# Patient Record
(demographics unavailable — no encounter records)

---

## 2025-02-05 NOTE — ASSESSMENT
[FreeTextEntry1] : This is a 92-year-old woman here with 2 issues:  Mild cognitive impairment: She is currently stable.  I will continue donepezil 5 mg daily as she is tolerating it.  We did discuss stopping it for side effects or if it is no longer helping.  Neuropathy: She is currently stable without any neuropathic pain or irritation.  We will continue gabapentin 1200 mg at night.  I will see her back in the office for continued care in 6 months, sooner should the need arise.  Asked her daughter to call me in the interim with any problems, questions or concerns.
English

## 2025-02-05 NOTE — PHYSICAL EXAM
[Person] : oriented to person [Place] : oriented to place [Time] : disoriented to time [Remote Intact] : remote memory intact [Registration Intact] : recent registration memory intact [Span Intact] : the attention span was normal [Concentration Intact] : normal concentrating ability [Visual Intact] : visual attention was ~T not ~L decreased [Naming Objects] : no difficulty naming common objects [Repeating Phrases] : no difficulty repeating a phrase [Fluency] : fluency intact [Comprehension] : comprehension intact [Current Events] : adequate knowledge of current events [Past History] : adequate knowledge of personal past history [Cranial Nerves Optic (II)] : visual acuity intact bilaterally,  visual fields full to confrontation, pupils equal round and reactive to light [Cranial Nerves Oculomotor (III)] : extraocular motion intact [Cranial Nerves Trigeminal (V)] : facial sensation intact symmetrically [Cranial Nerves Facial (VII)] : face symmetrical [Cranial Nerves Vestibulocochlear (VIII)] : hearing was intact bilaterally [Cranial Nerves Glossopharyngeal (IX)] : tongue and palate midline [Cranial Nerves Accessory (XI - Cranial And Spinal)] : head turning and shoulder shrug symmetric [Cranial Nerves Hypoglossal (XII)] : there was no tongue deviation with protrusion [Motor Tone] : muscle tone was normal in all four extremities [Motor Strength] : muscle strength was normal in all four extremities [Involuntary Movements] : no involuntary movements were seen [No Muscle Atrophy] : normal bulk in all four extremities [Motor Handedness Right-Handed] : the patient is right hand dominant [Paresis Pronator Drift Right-Sided] : no pronator drift on the right [Paresis Pronator Drift Left-Sided] : no pronator drift on the left [Motor Strength Upper Extremities Bilaterally] : strength was normal in both upper extremities [Motor Strength Lower Extremities Bilaterally] : strength was normal in both lower extremities [Sensation Tactile Decrease] : light touch was intact [Sensation Pain / Temperature Decrease] : pain and temperature was intact [Proprioception] : proprioception was intact [Vibration Decrease Leg / Foot Right Ankle] : decreased at the right ankle [Vibration Decrease Leg / Foot Left Ankle] : normal at the left ankle [Vibration Decrease Leg / Foot Toes Both Feet] : decreased at the toes of both feet  [Abnormal Walk] : normal gait [Balance] : balance was intact [Tremor] : no tremor present [Coordination - Dysmetria Impaired Finger-to-Nose Bilateral] : not present [1+] : Brachioradialis left 1+ [0] : Patella left 0 [FreeTextEntry4] : This is month January, year 2024 [Sclera] : the sclera and conjunctiva were normal [PERRL With Normal Accommodation] : pupils were equal in size, round, reactive to light, with normal accommodation [Extraocular Movements] : extraocular movements were intact [No APD] : no afferent pupillary defect [No EZIO] : no internuclear ophthalmoplegia [Full Visual Field] : full visual field

## 2025-02-05 NOTE — HISTORY OF PRESENT ILLNESS
[FreeTextEntry1] : 10/17/17: This is an 84-year-old woman who follows up today for peripheral neuropathy. She's feeling that is a little bit worse since she was seen about 6 months ago. She is currently taking gabapentin 600 mg twice a day with relief but wonders if she can have better relief. She is here today for routine neurologic followup is not reporting any new medical issues that have developed since her previous visit.  Followup May 7 2018: This is an 85-year-old woman returns today for followup of neuropathy. She feels is extended a bit her hands at this point. She is taking Neurontin 600 mg 3 times a day and tolerating it well. She however feels that her hands are numb and shaky at times. It does appear that she has a tremor that is worse with movement. She does not have a resting tremor. She is here today for neurologic followup also noting that her back is hurting her today.  Followup November 5, 2018: This is a 85-year-old woman has a profound neuropathy. She also has a mild tremor. Regarding the neuropathy she'll occasionally feel worse at night. She'll have the numbness in her feet as well as a cold sensation. She is currently taking gabapentin 2400 mg total daily. Occasionally she may miss a dose and feels worse when that happens. She is otherwise tolerating the medication well. She also has a tremor that is worse with activities. She states is intermittent. Is not present currently. She is here today for neurologic followup.  Followup May 6, 2019: This is an 86-year-old woman who presents today for neuropathy. She feels mostly worse at night. She is currently taking gabapentin 600 mg at lunchtime 600 mg at dinnertime and 1200 mg at bedtime. She has alternating sensations of cold feet versus hot feet. She is not having any side effects of gabapentin. She is here today for neurologic followup.  Followup February 18, 2020: This is an 87-year-old woman who presents today for followup of idiopathic neuropathy. Again she complains of feeling worse at night with coldness in her feet. She socks on and then has to take them off. When she takes them off her feet no longer bother her. She continues on gabapentin 600 mg in the morning and at dinner and 1200 mg at bedtime. She is tolerating gabapentin well. She is here today for followup.  Followup January 26, 2021: This is a 87-year-old woman who presents today neurologic followup of neuropathy. She is fairly stable from the neuropathy is not reporting any worsening symptoms. She does feel a bit unsteady on her feet at times which may be due to neuropathy. She continues to take gabapentin 2400 mg total daily. She has a mild tremor that's been present for over 2 years And is likely senile or essential tremor. She is here today for neurologic followup.  Followup July 27, 2021: This is an 88-year-old woman who presents today for followup of neuropathy. She is fairly stable of the neuropathy. She is taking gabapentin 600 mg 4 times daily. She tolerates this dose well and it seems to help with the neuropathy. At last visit she had stated she was unsteady this is no longer the case. She is here today for routine neurologic followup.  Follow-up May 4, 2022: This is an 89-year-old woman who presents today for follow-up of neuropathy.  She is also experiencing memory loss.  Regarding her neuropathy she is fairly stable on gabapentin she usually takes 3000 mg total a day but can take up to 3600 if needed.  She does complain that her feet are cold but it does get better if she puts socks or blanket over them.  She states to have a bluish discoloration when cold raising a possibility of a Raynaud's syndrome.  She also is experiencing memory loss.  Her  provides additional information.  He states that its been going on for several years slowly progressing.  Is mostly short-term memory with good preserved long-term memory.  She states that she may forget words in the middle of a sentence her  states that she repeats questions or repeats stories at times she also has trouble at times if she is cooking getting sidetracked on how to prepare recipes.  She is here today for neurologic follow-up.  Follow-up September 14, 2022: This is an 89-year-old woman with neuropathy and memory loss.  Regarding her neuropathy she has an idiopathic peripheral neuropathy.  She usually takes gabapentin 3000 mg a day with 600 at breakfast lunch and dinner times as well as 1200 at night.  This seems to be keeping the discomfort under control.  She does report some tingling now above the ankle which is a slight progression.  Regarding the memory loss she feels that she is stable.  She is continuing on donepezil for this.  She is here today for neurologic follow-up.  Follow-up January 9, 2023: This is an 89-year-old woman who presents with neuropathy and memory loss.  Regarding the neuropathy is fairly stable.  She is taking about 3600 mg of gabapentin total daily.  She is feeling a bit cold in her feet at night which is improved with blankets.  She has trouble wearing socks.  Regarding the memory it is also under control.  Some of her short-term and long-term.  Her  states that sometimes she could lose track of the plot of a movie or may forget names.  However she is able to do activities of daily living well without any impairment.  She continues on donepezil 5 mg daily.  She is here today for neurologic follow-up.  Follow-up January 12, 2024: This is a 90-year-old woman who presents today with neuropathy and memory loss.  Her daughter accompanies her today.  Her neuropathy is stable.  She is currently taking gabapentin 3000 mg daily.  She appears to be doing well on this dose.  She is also doing well with her memory issues.  Her  did pass away so she is having to do more on her own in the house.  She has 2 daughters that help.  She has her pills laid out for her and does not forget to take them.  She is able to do her activities of daily living independently.  She continues on donepezil 5 mg daily.  She is here today for neurologic follow-up.  Follow-up July 23, 2024: This is a 91-year-old woman who presents today with neuropathy and memory loss.  Her daughter accompanies her.  Her neuropathy is stable she is actually now taking less gabapentin.  She is taking 2400 mg total daily.  She also is taking donepezil 5 mg for memory.  Her daughter states her memory has been fairly stable since her last visit.  She is here today for neurologic follow-up.  Follow-up February 5, 2025: This is a 92-year-old woman who presents today with neuropathy and memory loss.  Her neuropathy is stable if not improved.  She is taking 1200 mg of gabapentin at night.  She is not taking it other times during the day.  She does not feel that the neuropathy is active.  Regarding her memory that is also stable.  Her daughter states that other than needing assistance with her medications she is fairly independent with her activities of daily living.  She is here today for neurologic follow-up.

## 2025-02-05 NOTE — CONSULT LETTER
[Dear  ___] : Dear  [unfilled], [Courtesy Letter:] : I had the pleasure of seeing your patient, [unfilled], in my office today. [Please see my note below.] : Please see my note below. [Consult Closing:] : Thank you very much for allowing me to participate in the care of this patient.  If you have any questions, please do not hesitate to contact me. [Sincerely,] : Sincerely, [FreeTextEntry3] : Seferino Wolfe M.D., Ph.D. DPN-N [facility] [place] Director, Division of Neurology Director, Comprehensive Stroke Center [facility]

## 2025-03-04 NOTE — PHYSICAL EXAM
[General Appearance - Alert] : alert [General Appearance - In No Acute Distress] : in no acute distress [General Appearance - Well Nourished] : well nourished [General Appearance - Well Developed] : well developed [Person] : oriented to person [Place] : oriented to place [Time] : oriented to time [Remote Intact] : remote memory intact [Registration Intact] : recent registration memory intact [Span Intact] : the attention span was normal [Concentration Intact] : normal concentrating ability [Visual Intact] : visual attention was ~T not ~L decreased [Naming Objects] : no difficulty naming common objects [Repeating Phrases] : no difficulty repeating a phrase [Fluency] : fluency intact [Comprehension] : comprehension intact [Current Events] : adequate knowledge of current events [Past History] : adequate knowledge of personal past history [Cranial Nerves Optic (II)] : visual acuity intact bilaterally,  visual fields full to confrontation, pupils equal round and reactive to light [Cranial Nerves Oculomotor (III)] : extraocular motion intact [Cranial Nerves Trigeminal (V)] : facial sensation intact symmetrically [Cranial Nerves Facial (VII)] : face symmetrical [Cranial Nerves Vestibulocochlear (VIII)] : hearing was intact bilaterally [Cranial Nerves Glossopharyngeal (IX)] : tongue and palate midline [Cranial Nerves Accessory (XI - Cranial And Spinal)] : head turning and shoulder shrug symmetric [Cranial Nerves Hypoglossal (XII)] : there was no tongue deviation with protrusion [Motor Tone] : muscle tone was normal in all four extremities [Motor Strength] : muscle strength was normal in all four extremities [Involuntary Movements] : no involuntary movements were seen [No Muscle Atrophy] : normal bulk in all four extremities [Motor Handedness Right-Handed] : the patient is right hand dominant [Paresis Pronator Drift Right-Sided] : no pronator drift on the right [Paresis Pronator Drift Left-Sided] : no pronator drift on the left [Motor Strength Upper Extremities Bilaterally] : strength was normal in both upper extremities [Motor Strength Lower Extremities Bilaterally] : strength was normal in both lower extremities [Sensation Tactile Decrease] : light touch was intact [Sensation Pain / Temperature Decrease] : pain and temperature was intact [Proprioception] : proprioception was intact [Vibration Decrease Leg / Foot Right Ankle] : decreased at the right ankle [Vibration Decrease Leg / Foot Left Ankle] : normal at the left ankle [Vibration Decrease Leg / Foot Toes Both Feet] : decreased at the toes of both feet  [Abnormal Walk] : normal gait [Balance] : balance was intact [Tremor] : no tremor present [Coordination - Dysmetria Impaired Finger-to-Nose Bilateral] : not present [1+] : Brachioradialis left 1+ [0] : Patella left 0 [Sclera] : the sclera and conjunctiva were normal [PERRL With Normal Accommodation] : pupils were equal in size, round, reactive to light, with normal accommodation [Extraocular Movements] : extraocular movements were intact [No APD] : no afferent pupillary defect [No EZIO] : no internuclear ophthalmoplegia [Full Visual Field] : full visual field

## 2025-03-04 NOTE — ASSESSMENT
[FreeTextEntry1] : This is a 92-year-old woman who presents today for hospital follow-up of stroke.  She is also seen in the past with peripheral neuropathy and mild cognitive impairment.  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  She was placed on aspirin and Plavix and atorvastatin.  She should continue the aspirin.  She should continue Plavix for total of 21 days which would put her as an end date in mid March.  She should continue atorvastatin and titrate to a LDL of under 70.  She will need to control her blood pressure.  Peripheral neuropathy: This appears to be stable.  She will continue gabapentin 1200 mg at night.  Mild cognitive impairment this 2 appears to be stable.  I will continue donepezil 5 mg at night.  I will see her for follow-up and continued care of the above conditions in August, sooner should the need arise.  I have asked the patient and her daughter to call with any problems, questions or concerns.

## 2025-03-04 NOTE — HISTORY OF PRESENT ILLNESS
[FreeTextEntry1] : 10/17/17: This is an 84-year-old woman who follows up today for peripheral neuropathy. She's feeling that is a little bit worse since she was seen about 6 months ago. She is currently taking gabapentin 600 mg twice a day with relief but wonders if she can have better relief. She is here today for routine neurologic followup is not reporting any new medical issues that have developed since her previous visit.  Followup May 7 2018: This is an 85-year-old woman returns today for followup of neuropathy. She feels is extended a bit her hands at this point. She is taking Neurontin 600 mg 3 times a day and tolerating it well. She however feels that her hands are numb and shaky at times. It does appear that she has a tremor that is worse with movement. She does not have a resting tremor. She is here today for neurologic followup also noting that her back is hurting her today.  Followup November 5, 2018: This is a 85-year-old woman has a profound neuropathy. She also has a mild tremor. Regarding the neuropathy she'll occasionally feel worse at night. She'll have the numbness in her feet as well as a cold sensation. She is currently taking gabapentin 2400 mg total daily. Occasionally she may miss a dose and feels worse when that happens. She is otherwise tolerating the medication well. She also has a tremor that is worse with activities. She states is intermittent. Is not present currently. She is here today for neurologic followup.  Followup May 6, 2019: This is an 86-year-old woman who presents today for neuropathy. She feels mostly worse at night. She is currently taking gabapentin 600 mg at lunchtime 600 mg at dinnertime and 1200 mg at bedtime. She has alternating sensations of cold feet versus hot feet. She is not having any side effects of gabapentin. She is here today for neurologic followup.  Followup February 18, 2020: This is an 87-year-old woman who presents today for followup of idiopathic neuropathy. Again she complains of feeling worse at night with coldness in her feet. She socks on and then has to take them off. When she takes them off her feet no longer bother her. She continues on gabapentin 600 mg in the morning and at dinner and 1200 mg at bedtime. She is tolerating gabapentin well. She is here today for followup.  Followup January 26, 2021: This is a 87-year-old woman who presents today neurologic followup of neuropathy. She is fairly stable from the neuropathy is not reporting any worsening symptoms. She does feel a bit unsteady on her feet at times which may be due to neuropathy. She continues to take gabapentin 2400 mg total daily. She has a mild tremor that's been present for over 2 years And is likely senile or essential tremor. She is here today for neurologic followup.  Followup July 27, 2021: This is an 88-year-old woman who presents today for followup of neuropathy. She is fairly stable of the neuropathy. She is taking gabapentin 600 mg 4 times daily. She tolerates this dose well and it seems to help with the neuropathy. At last visit she had stated she was unsteady this is no longer the case. She is here today for routine neurologic followup.  Follow-up May 4, 2022: This is an 89-year-old woman who presents today for follow-up of neuropathy.  She is also experiencing memory loss.  Regarding her neuropathy she is fairly stable on gabapentin she usually takes 3000 mg total a day but can take up to 3600 if needed.  She does complain that her feet are cold but it does get better if she puts socks or blanket over them.  She states to have a bluish discoloration when cold raising a possibility of a Raynaud's syndrome.  She also is experiencing memory loss.  Her  provides additional information.  He states that its been going on for several years slowly progressing.  Is mostly short-term memory with good preserved long-term memory.  She states that she may forget words in the middle of a sentence her  states that she repeats questions or repeats stories at times she also has trouble at times if she is cooking getting sidetracked on how to prepare recipes.  She is here today for neurologic follow-up.  Follow-up September 14, 2022: This is an 89-year-old woman with neuropathy and memory loss.  Regarding her neuropathy she has an idiopathic peripheral neuropathy.  She usually takes gabapentin 3000 mg a day with 600 at breakfast lunch and dinner times as well as 1200 at night.  This seems to be keeping the discomfort under control.  She does report some tingling now above the ankle which is a slight progression.  Regarding the memory loss she feels that she is stable.  She is continuing on donepezil for this.  She is here today for neurologic follow-up.  Follow-up January 9, 2023: This is an 89-year-old woman who presents with neuropathy and memory loss.  Regarding the neuropathy is fairly stable.  She is taking about 3600 mg of gabapentin total daily.  She is feeling a bit cold in her feet at night which is improved with blankets.  She has trouble wearing socks.  Regarding the memory it is also under control.  Some of her short-term and long-term.  Her  states that sometimes she could lose track of the plot of a movie or may forget names.  However she is able to do activities of daily living well without any impairment.  She continues on donepezil 5 mg daily.  She is here today for neurologic follow-up.  Follow-up January 12, 2024: This is a 90-year-old woman who presents today with neuropathy and memory loss.  Her daughter accompanies her today.  Her neuropathy is stable.  She is currently taking gabapentin 3000 mg daily.  She appears to be doing well on this dose.  She is also doing well with her memory issues.  Her  did pass away so she is having to do more on her own in the house.  She has 2 daughters that help.  She has her pills laid out for her and does not forget to take them.  She is able to do her activities of daily living independently.  She continues on donepezil 5 mg daily.  She is here today for neurologic follow-up.  Follow-up July 23, 2024: This is a 91-year-old woman who presents today with neuropathy and memory loss.  Her daughter accompanies her.  Her neuropathy is stable she is actually now taking less gabapentin.  She is taking 2400 mg total daily.  She also is taking donepezil 5 mg for memory.  Her daughter states her memory has been fairly stable since her last visit.  She is here today for neurologic follow-up.  Follow-up February 5, 2025: This is a 92-year-old woman who presents today with neuropathy and memory loss.  Her neuropathy is stable if not improved.  She is taking 1200 mg of gabapentin at night.  She is not taking it other times during the day.  She does not feel that the neuropathy is active.  Regarding her memory that is also stable.  Her daughter states that other than needing assistance with her medications she is fairly independent with her activities of daily living.  She is here today for neurologic follow-up.  Follow-up March 4, 2025: This is a 92-year-old woman who is typically seen with neuropathy and memory loss.  Her neuropathy is stable taking gabapentin 1200 mg at night and her memory loss is also stable on low-dose donepezil.  She is here today as a hospital follow-up having had a stroke February 22.  She noticed there was some visual symptoms as well as a difficulty using her left arm.  The left arm symptoms resolved and the visual symptoms are persistent but improved compared to the initial presentation apparently.  MRI confirmed bilateral PCA strokes and arterial imaging showed stenosis in the M1 and P2's segments on the left side.  She was placed on aspirin Plavix and atorvastatin.  She is supposed to take the Plavix for a total of 21 days.  She is here today for neurologic follow-up.

## 2025-03-04 NOTE — CONSULT LETTER
[Dear  ___] : Dear  [unfilled], [Courtesy Letter:] : I had the pleasure of seeing your patient, [unfilled], in my office today. [Please see my note below.] : Please see my note below. [Consult Closing:] : Thank you very much for allowing me to participate in the care of this patient.  If you have any questions, please do not hesitate to contact me. [Sincerely,] : Sincerely, [FreeTextEntry3] : Seferino Wolef M.D., Ph.D. DPN-N [facility] [place] Director, Division of Neurology Director, Comprehensive Stroke Center [facility]

## 2025-03-04 NOTE — DATA REVIEWED
[de-identified] : She had an MRI of her brain on 22 February which showed bilateral restricted diffusion in PCA territory suggestive of acute stroke.  There was no bleeding noted.  There is no tumor. [de-identified] : I reviewed hospital records from her recent hospitalization at the end of February for stroke that were pertinent to this visit.

## 2025-03-05 NOTE — PHYSICAL EXAM
[Well Developed] : well developed [No Acute Distress] : no acute distress [No Carotid Bruit] : no carotid bruit [Normal S1, S2] : normal S1, S2 [No Murmur] : no murmur [Clear Lung Fields] : clear lung fields [No Respiratory Distress] : no respiratory distress  [Normal Gait] : normal gait [Edema ___] : edema [unfilled] [Venous varicosities] : venous varicosities [Moves all extremities] : moves all extremities [Normal Speech] : normal speech [Alert and Oriented] : alert and oriented [Normal memory] : normal memory

## 2025-03-05 NOTE — HISTORY OF PRESENT ILLNESS
[FreeTextEntry1] : reason : dyspnea on exertion  and routine appt    HPI for today: : 3 5 [id number]:  she has been having significant dyspnea on exertion .  going few steps ffrom bathroom to kitchen.  she felt dyspnea on exertion .  she went to her PCP who got an xray.  adn it was  fine.   she was in the hpsital  on feb 20. 2025.  her roght arm.  she was not able to  .  she was weak on the righ tarm.   she had a stroke in [place] back of her head.  she also had strok eof her eyes.  and trouble of vision.     could not explain the weakness of the right arm.  her Pro bnop was eleavted.    old note: 3/10/2022: 90 y/o F with PMH GERD, neuropathy presents for initial visit. Patient complains of dull pain under her L breast for the past 1 month. Nonradiating, nonexertional. States she had a similar pain many years ago and was told she had a muscle problem. Denies dyspnea, palpitations, dizziness, LE edema. Occasionally feels off balance. Patient fell in the bathroom this AM due to feeling off balance; denies syncope. She also states that she recently has been experiencing significant diarrhea; has been following with PCP, who has decreased aricept and gabapentin as possible causes.   9/19/2023: Patient presents to the office for routine cardiac follow up visit after being instructed by her PCP to do so. Reports feeling well. She is accompanied by her  and daughter. States she is very active, cleaning her home often. Reports feeling tired at times; she does not feel this is more than normal considering her age and activity level. Reports chronic intermittent BL ankle edema, no change from usual baseline. Denies chest pain/ pressure and tightness, SOB at rest, MANRIQUEZ, palpitations, lightheadedness, dizziness, fatigue, syncope, near syncope. Denies smoking, excessive alcohol and illicit drug use.

## 2025-03-05 NOTE — REVIEW OF SYSTEMS
[Feeling Fatigued] : feeling fatigued [Lower Ext Edema] : lower extremity edema [SOB] : no shortness of breath [Dyspnea on exertion] : not dyspnea during exertion [Chest Discomfort] : no chest discomfort [Palpitations] : no palpitations [Orthopnea] : no orthopnea [Syncope] : no syncope [Dizziness] : no dizziness

## 2025-03-05 NOTE — CARDIOLOGY SUMMARY
[de-identified] : 3 5 2025: Sinus Rhythm  WITHIN NORMAL LIMITS   3/10/22: SR at 55 bpm  9/19/2023: NSR, negative precordial T waves consistent with previous EKGs, no acute changes [de-identified] : Holter 6 days 3/2022: no significant arrhythmias.  [de-identified] : Pharm NST 3/2023: normal [de-identified] : lVEF 53.  normal RV.  midl AI.  TTE 3/2022: LVEF 61%, Grade II DD, no significant valvular abnormalities

## 2025-03-27 NOTE — CONSULT LETTER
[Dear  ___] : Dear  [unfilled], [Consult Letter:] : I had the pleasure of evaluating your patient, [unfilled]. [Please see my note below.] : Please see my note below. [Sincerely,] : Sincerely, [DrJuno  ___] : Dr. BURGESS [FreeTextEntry3] : Jose Knight DO [facility] Director Pulmonary Division Medical Director Respiratory Therapy [facility]

## 2025-03-27 NOTE — CONSULT LETTER
[Dear  ___] : Dear  [unfilled], [Consult Letter:] : I had the pleasure of evaluating your patient, [unfilled]. [Please see my note below.] : Please see my note below. [Sincerely,] : Sincerely, [DrJuno  ___] : Dr. BURGESS [FreeTextEntry3] : Jose Knight DO [facility] Director Pulmonary Division Medical Director Respiratory Therapy [facility]

## 2025-03-27 NOTE — HISTORY OF PRESENT ILLNESS
[Former] : former [< 20 pack-years] : < 20 pack-years [TextBox_4] : Hospital Course: Discharge Date 22-Feb-2025 Admission Date 20-Feb-2025 13:45 Reason for Admission Right arm weakness and visual impairment Hospital Course  92y Female from home lives alone with past medical history of dementia, neuropathy, bullous pemphigoid presenting to ED with right arm weakness. Last known well 2/19/25 at approximately 9:30 PM. Woke up on 2/20/25 and felt her right arm weak, and she dropped the remote. Her daughter reports noticing at around 1030 am her right arm weakness. On arrival to ED patient's strength is intact reports her arm "feels weird".  She endorses visual changes intermittently over the last week that has fluctuated, endorses being treated outpatient for a UTI (last day abx of 2/21), and bullous pemphigoid for which she is currently on a course of steroids. CT Head showed evidence of acute-subacute right greater than left occipital infarcts. No evidence of hemorrhagic transformation. CT angio of head showed no large vessel occlusion. Severe focal stenosis left P2 and M2 segments. CT perfusion with no predicted core infarct. Patient was found not to be a candidate for tenecteplase due to being outside the treatment window. Patient is not a thrombectomy candidate given no large vessel occlusion. MRI head completed showing bilateral acute occipital lobe infarcts. MRA head/neck without significant stenosis or occlusion.  Hospital Course: Discharge Date 10-Mar-2025 Admission Date 08-Mar-2025 00:34 Reason for Admission COPD Exacerbation with Hypoxia Hospital Course  92ypF with a history of dementia, bullous pemphigoid, and neuropathy, with a recent hospitalization for stroke, who presented with increased dyspnea and lower extremity swelling and found to have hypoxia. Patient diuresed with IV lasix with improvement in LE edema. Also treated for COPD exacerbation with azithro and steroids. Patient was seen by caridology and pulmonary. Pulm recommending d/c on prednisone taper, inhaler, f/u with outpatient pulm for repeat PFTs; however patient already on a slow taper of prednisone per dermatology for her bullous pemphigoid. Patient worked with PT, recommending d/c home with home PT.  Patent to start prednisone 40mg once daily x 3 days, followed by prednisone 30mg once daily x 3 days, followed by prednisone 25mg once daily and continue taper provided by dermatology.   Prednisone not tapered concerned over Bullous Pemphigoid affecting lung no sputum no fever, chill, chest pain  [YearQuit] : [id number]

## 2025-03-27 NOTE — HISTORY OF PRESENT ILLNESS
[Former] : former [< 20 pack-years] : < 20 pack-years [TextBox_4] : Hospital Course: Discharge Date 22-Feb-2025 Admission Date 20-Feb-2025 13:45 Reason for Admission Right arm weakness and visual impairment Hospital Course  92y Female from home lives alone with past medical history of dementia, neuropathy, bullous pemphigoid presenting to ED with right arm weakness. Last known well 2/19/25 at approximately 9:30 PM. Woke up on 2/20/25 and felt her right arm weak, and she dropped the remote. Her daughter reports noticing at around 1030 am her right arm weakness. On arrival to ED patient's strength is intact reports her arm "feels weird".  She endorses visual changes intermittently over the last week that has fluctuated, endorses being treated outpatient for a UTI (last day abx of 2/21), and bullous pemphigoid for which she is currently on a course of steroids. CT Head showed evidence of acute-subacute right greater than left occipital infarcts. No evidence of hemorrhagic transformation. CT angio of head showed no large vessel occlusion. Severe focal stenosis left P2 and M2 segments. CT perfusion with no predicted core infarct. Patient was found not to be a candidate for tenecteplase due to being outside the treatment window. Patient is not a thrombectomy candidate given no large vessel occlusion. MRI head completed showing bilateral acute occipital lobe infarcts. MRA head/neck without significant stenosis or occlusion.  Hospital Course: Discharge Date 10-Mar-2025 Admission Date 08-Mar-2025 00:34 Reason for Admission COPD Exacerbation with Hypoxia Hospital Course  92ypF with a history of dementia, bullous pemphigoid, and neuropathy, with a recent hospitalization for stroke, who presented with increased dyspnea and lower extremity swelling and found to have hypoxia. Patient diuresed with IV lasix with improvement in LE edema. Also treated for COPD exacerbation with azithro and steroids. Patient was seen by caridology and pulmonary. Pulm recommending d/c on prednisone taper, inhaler, f/u with outpatient pulm for repeat PFTs; however patient already on a slow taper of prednisone per dermatology for her bullous pemphigoid. Patient worked with PT, recommending d/c home with home PT.  Patent to start prednisone 40mg once daily x 3 days, followed by prednisone 30mg once daily x 3 days, followed by prednisone 25mg once daily and continue taper provided by dermatology.   Prednisone not tapered concerned over Bullous Pemphigoid affecting lung no sputum no fever, chill, chest pain  [YearQuit] : [id number]

## 2025-03-27 NOTE — PHYSICAL EXAM
[No Acute Distress] : no acute distress [Normal Appearance] : normal appearance [Normal Rate/Rhythm] : normal rate/rhythm [Normal S1, S2] : normal s1, s2 [No Edema] : no edema [No Murmurs] : no murmurs [No Rubs] : no rubs [No Gallops] : no gallops [No Resp Distress] : no resp distress [No Acc Muscle Use] : no acc muscle use [Normal Palpation] : normal palpation [Clear to Auscultation Bilaterally] : clear to auscultation bilaterally [Normal to Percussion] : normal to percussion [No Abnormalities] : no abnormalities [Normal Gait] : normal gait [No Clubbing] : no clubbing [No Cyanosis] : no cyanosis [FROM] : FROM [Normal Color/ Pigmentation] : normal color/ pigmentation [No Focal Deficits] : no focal deficits [Oriented x3] : oriented x3 [Normal Affect] : normal affect [TextBox_105] : edema bilat

## 2025-03-27 NOTE — DISCUSSION/SUMMARY
[FreeTextEntry1] : MANRIQUEZ much improved, nonmonic on room air, at baseline currently Distant smoking hx, prior PFt without obstruction 2023 Dementia Recent bilateral PCA infarcts, Stenosis M1,P2 on left, now on asa/plavix Re admitted for dyspnea, treated as COPD, no swallowing issues CT scan with no PE, mosaic vs faint GG with septal markings ? 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